# Patient Record
Sex: MALE | Race: WHITE | NOT HISPANIC OR LATINO | URBAN - METROPOLITAN AREA
[De-identification: names, ages, dates, MRNs, and addresses within clinical notes are randomized per-mention and may not be internally consistent; named-entity substitution may affect disease eponyms.]

---

## 2019-04-28 ENCOUNTER — ANCILLARY PROCEDURE (OUTPATIENT)
Dept: GENERAL RADIOLOGY | Facility: CLINIC | Age: 24
End: 2019-04-28
Attending: FAMILY MEDICINE
Payer: COMMERCIAL

## 2019-04-28 ENCOUNTER — OFFICE VISIT (OUTPATIENT)
Dept: ORTHOPEDICS | Facility: CLINIC | Age: 24
End: 2019-04-28
Payer: COMMERCIAL

## 2019-04-28 VITALS
BODY MASS INDEX: 20.67 KG/M2 | HEART RATE: 71 BPM | SYSTOLIC BLOOD PRESSURE: 116 MMHG | WEIGHT: 156 LBS | DIASTOLIC BLOOD PRESSURE: 75 MMHG | HEIGHT: 73 IN

## 2019-04-28 DIAGNOSIS — M25.572 ACUTE LEFT ANKLE PAIN: Primary | ICD-10-CM

## 2019-04-28 ASSESSMENT — MIFFLIN-ST. JEOR: SCORE: 1756.49

## 2019-04-28 ASSESSMENT — PAIN SCALES - GENERAL: PAINLEVEL: NO PAIN (1)

## 2019-04-28 NOTE — PROGRESS NOTES
NEW PATIENT INTAKE QUESTIONNAIRE  SPORTS & ORTHOPEDIC WALK-IN 4/28/2019    Primary Care Physician: none    Reason for Visit:    What part of your body is injured / painful?  left ankle    What caused the injury /pain? Recreational/competitive sports injury -  SPORTS:[id number]}    How long ago did your injury occur or pain begin? yesterday    What are your most bothersome symptoms? Pain    How would you characterize your symptom? aching    What makes your symptoms better? Rest    What makes your symptoms worse? Standing, Walking, Sitting and Movement    Have you been previously seen for this problem? No    Medical History:    Medical History: none    Have you had surgery on this body part before? No    Medications: none    Allergies: No known drug allergies    Family History of Medical Problems: none    Previous Surgeries: none    Social History:    Occupation: Student     Handedness: Right    Review of Systems:    Have you recently had a a fever, chills, weight loss? No    Do you have any vision problems? No    Do you have any chest pain or edema? No    Do you have any shortness of breath or wheezing?  No    Do you have stomach problems? No    Do you have any numbness or focal weakness? No    Do you have diabetes? No    Do you have problems with bleeding or clotting? No    Do you have an rashes or other skin lesions? No           HPI    Mr. Tyler is a pleasant 23 year old year old male who presents to orthopedic walk in clinic today with concerns of left ankle pain and swelling for 1 day.  Parker explains that he was playing ultimate Frisbee yesterday when 2 other participants landed on his ankle.  He is unsure if it inverted or everted.  After that time he had pain with ambulation and is developed swelling over the lateral aspect of the ankle.  No history of significant ankle pain or injuries.  No numbness or tingling.  He is having difficulty walking on the affected extremity.      Medical History     Denies  "significant medical or surgical history other than noted above.     No Known Allergies    Social History     Socioeconomic History     Marital status: Single     Spouse name: Not on file     Number of children: Not on file     Years of education: Not on file     Highest education level: Not on file   Occupational History     Not on file   Social Needs     Financial resource strain: Not on file     Food insecurity:     Worry: Not on file     Inability: Not on file     Transportation needs:     Medical: Not on file     Non-medical: Not on file   Tobacco Use     Smoking status: Never Smoker     Smokeless tobacco: Never Used   Substance and Sexual Activity     Alcohol use: Not on file     Drug use: Not on file     Sexual activity: Not on file   Lifestyle     Physical activity:     Days per week: Not on file     Minutes per session: Not on file     Stress: Not on file   Relationships     Social connections:     Talks on phone: Not on file     Gets together: Not on file     Attends Latter day service: Not on file     Active member of club or organization: Not on file     Attends meetings of clubs or organizations: Not on file     Relationship status: Not on file     Intimate partner violence:     Fear of current or ex partner: Not on file     Emotionally abused: Not on file     Physically abused: Not on file     Forced sexual activity: Not on file   Other Topics Concern     Not on file   Social History Narrative     Not on file       No family history on file.    Current Outpatient Medications   Medication     Acetaminophen 325 MG CAPS     No current facility-administered medications for this visit.            Objective Findings     Vitals:    04/28/19 0817   BP: 116/75   BP Location: Left arm   Patient Position: Sitting   Pulse: 71   Weight: 70.8 kg (156 lb)   Height: 1.854 m (6' 1\")         Physical Exam:  Gen: A&O in NAD   CV: extremities well perfused x4  Pulm: without respiratory distress  Derm: no rashes or " lesions  Psych: normal affect and speech  Gait: Sitting in a wheelchair    MSK:   -Left ankle: Notable swelling over the lateral malleolus.  Range of motion is slightly limited secondary to discomfort.  He is intact 5/5 distal ankle and foot strength.  He has tenderness palpation over the distal lateral malleolus.  He is exquisitely tender over the ATFL and less so tender over the CFL.  Nontender over the PTFL.  Mildly tender over the deltoid ligament.  He is nontender over the bony prominence of the foot including the navicular, base of fifth, and metatarsals.  Mild discomfort with syndesmotic squeeze and eversion stress.  He has intact distal sensation and brisk capillary refill.      Three-view x-ray of the left ankle was obtained today with the following findings:       Impression:  1. No acute osseous abnormality.  2. Soft tissue swelling overlying lateral malleolus.           Assessment / Plan     #) Left ankle pain and swelling    -Findings reviewed with patient as above  -Imaging reviewed and discussed as above  -X-rays are reassuring  -Discussed options for care including weightbearing as tolerated vs short course of immobilization in a cam boot and then subsequent progression vs short period of off loading with crutches  -Following joint decision-making, patient was interested in short period of off loading with crutches and ankle brace then progression off of crutches over next 3 days.  -If he does not have improvement over the subsequent week, recommend follow-up for evaluation at which time advanced imaging could be considered  -Discussed the occurrence of the injury could put him at increased risk of subsequent injuries in the future, recommend proprioceptive exercises and wearing a lace up ankle brace  -If prolonged course of improvement, he may benefit from formal physical therapy in the near future  -Recommend NSAIDs, activity modification, compression, and elevation in the interim    Patient  endorsed understanding and agreement with the above plan    Martin Martins MD  Primary Care Sports Medicine, [facility]

## 2019-04-28 NOTE — LETTER
4/28/2019       RE: Parker Tyler  [address]  [facility] [address]     Dear Colleague,    Thank you for referring your patient, Parker Tyler, to the [facility] SPORTS AND ORTHOPAEDIC WALK IN CLINIC at [facility]. Please see a copy of my visit note below.         NEW PATIENT INTAKE QUESTIONNAIRE  SPORTS & ORTHOPEDIC WALK-IN 4/28/2019    Primary Care Physician: none    Reason for Visit:    What part of your body is injured / painful?  left ankle    What caused the injury /pain? Recreational/competitive sports injury -  SPORTS:[id number]}    How long ago did your injury occur or pain begin? yesterday    What are your most bothersome symptoms? Pain    How would you characterize your symptom? aching    What makes your symptoms better? Rest    What makes your symptoms worse? Standing, Walking, Sitting and Movement    Have you been previously seen for this problem? No    Medical History:    Medical History: none    Have you had surgery on this body part before? No    Medications: none    Allergies: No known drug allergies    Family History of Medical Problems: none    Previous Surgeries: none    Social History:    Occupation: Student     Handedness: Right    Review of Systems:    Have you recently had a a fever, chills, weight loss? No    Do you have any vision problems? No    Do you have any chest pain or edema? No    Do you have any shortness of breath or wheezing?  No    Do you have stomach problems? No    Do you have any numbness or focal weakness? No    Do you have diabetes? No    Do you have problems with bleeding or clotting? No    Do you have an rashes or other skin lesions? No           HPI    Mr. Tyler is a pleasant 23 year old year old male who presents to orthopedic walk in clinic today with concerns of left ankle pain and swelling for 1 day.  Parker explains that he was playing ultimate Frisbee yesterday when 2 other participants landed on his ankle.   He is unsure if it inverted or everted.  After that time he had pain with ambulation and is developed swelling over the lateral aspect of the ankle.  No history of significant ankle pain or injuries.  No numbness or tingling.  He is having difficulty walking on the affected extremity.      Medical History     Denies significant medical or surgical history other than noted above.     No Known Allergies    Social History     Socioeconomic History     Marital status: Single     Spouse name: Not on file     Number of children: Not on file     Years of education: Not on file     Highest education level: Not on file   Occupational History     Not on file   Social Needs     Financial resource strain: Not on file     Food insecurity:     Worry: Not on file     Inability: Not on file     Transportation needs:     Medical: Not on file     Non-medical: Not on file   Tobacco Use     Smoking status: Never Smoker     Smokeless tobacco: Never Used   Substance and Sexual Activity     Alcohol use: Not on file     Drug use: Not on file     Sexual activity: Not on file   Lifestyle     Physical activity:     Days per week: Not on file     Minutes per session: Not on file     Stress: Not on file   Relationships     Social connections:     Talks on phone: Not on file     Gets together: Not on file     Attends Zoroastrianism service: Not on file     Active member of club or organization: Not on file     Attends meetings of clubs or organizations: Not on file     Relationship status: Not on file     Intimate partner violence:     Fear of current or ex partner: Not on file     Emotionally abused: Not on file     Physically abused: Not on file     Forced sexual activity: Not on file   Other Topics Concern     Not on file   Social History Narrative     Not on file       No family history on file.    Current Outpatient Medications   Medication     Acetaminophen 325 MG CAPS     No current facility-administered medications for this visit.   "          Objective Findings     Vitals:    04/28/19 0817   BP: 116/75   BP Location: Left arm   Patient Position: Sitting   Pulse: 71   Weight: 70.8 kg (156 lb)   Height: 1.854 m (6' 1\")         Physical Exam:  Gen: A&O in NAD   CV: extremities well perfused x4  Pulm: without respiratory distress  Derm: no rashes or lesions  Psych: normal affect and speech  Gait: Sitting in a wheelchair    MSK:   -Left ankle: Notable swelling over the lateral malleolus.  Range of motion is slightly limited secondary to discomfort.  He is intact 5/5 distal ankle and foot strength.  He has tenderness palpation over the distal lateral malleolus.  He is exquisitely tender over the ATFL and less so tender over the CFL.  Nontender over the PTFL.  Mildly tender over the deltoid ligament.  He is nontender over the bony prominence of the foot including the navicular, base of fifth, and metatarsals.  Mild discomfort with syndesmotic squeeze and eversion stress.  He has intact distal sensation and brisk capillary refill.      Three-view x-ray of the left ankle was obtained today with the following findings:       Impression:  1. No acute osseous abnormality.  2. Soft tissue swelling overlying lateral malleolus.           Assessment / Plan     #) Left ankle pain and swelling    -Findings reviewed with patient as above  -Imaging reviewed and discussed as above  -X-rays are reassuring  -Discussed options for care including weightbearing as tolerated vs short course of immobilization in a cam boot and then subsequent progression vs short period of off loading with crutches  -Following joint decision-making, patient was interested in short period of off loading with crutches and ankle brace then progression off of crutches over next 3 days.  -If he does not have improvement over the subsequent week, recommend follow-up for evaluation at which time advanced imaging could be considered  -Discussed the occurrence of the injury could put him at " increased risk of subsequent injuries in the future, recommend proprioceptive exercises and wearing a lace up ankle brace  -If prolonged course of improvement, he may benefit from formal physical therapy in the near future  -Recommend NSAIDs, activity modification, compression, and elevation in the interim    Patient endorsed understanding and agreement with the above plan    Martin Martins MD  Primary Care Sports Medicine, [facility]        Again, thank you for allowing me to participate in the care of your patient.      Sincerely,    Martin Martins MD

## 2024-09-13 ENCOUNTER — OFFICE VISIT (OUTPATIENT)
Dept: FAMILY MEDICINE | Facility: CLINIC | Age: 29
End: 2024-09-13
Payer: COMMERCIAL

## 2024-09-13 VITALS
WEIGHT: 169.6 LBS | HEART RATE: 52 BPM | HEIGHT: 73 IN | BODY MASS INDEX: 22.48 KG/M2 | RESPIRATION RATE: 16 BRPM | OXYGEN SATURATION: 97 % | TEMPERATURE: 97.5 F | SYSTOLIC BLOOD PRESSURE: 116 MMHG | DIASTOLIC BLOOD PRESSURE: 65 MMHG

## 2024-09-13 DIAGNOSIS — I86.1 LEFT VARICOCELE: ICD-10-CM

## 2024-09-13 DIAGNOSIS — L73.9 FOLLICULITIS: Primary | ICD-10-CM

## 2024-09-13 PROCEDURE — 90471 IMMUNIZATION ADMIN: CPT | Performed by: FAMILY MEDICINE

## 2024-09-13 PROCEDURE — 90656 IIV3 VACC NO PRSV 0.5 ML IM: CPT | Performed by: FAMILY MEDICINE

## 2024-09-13 PROCEDURE — 99203 OFFICE O/P NEW LOW 30 MIN: CPT | Mod: 25 | Performed by: FAMILY MEDICINE

## 2024-09-13 RX ORDER — CIPROFLOXACIN 500 MG/1
500 TABLET, FILM COATED ORAL 2 TIMES DAILY
Qty: 20 TABLET | Refills: 0 | Status: SHIPPED | OUTPATIENT
Start: 2024-09-13

## 2024-09-13 NOTE — PROGRESS NOTES
"  Assessment & Plan     Folliculitis  Left testicle. Went into hot tub at hotel prior to onset. Antibiotics sent. Rtc in 7-10 days if not improved.   - ciprofloxacin (CIPRO) 500 MG tablet  Dispense: 20 tablet; Refill: 0    Left varicocele  Previous [place] in [place] 3 years ago.                   Juvenal Canales is a 28 year old, presenting for the following health issues:  Testicular Problem (Bump on left testicle, tender to the touch, x 5 days )        9/13/2024     3:50 PM   Additional Questions   Roomed by Shilpi NAYLOR LPN     History of Present Illness       Reason for visit:  Something on scrotum\testicle  Symptom onset:  3-7 days ago   He is taking medications regularly.     Mildly painful.  Had frisbee tournament then noticed the bump and tenderness that is spreading up.   Sexually active with one long term partner.    Has hip adductor tendinopathy.            Review of Systems  Constitutional, HEENT, cardiovascular, pulmonary, gi and gu systems are negative, except as otherwise noted.      Objective    /65   Pulse 52   Temp 97.5  F (36.4  C) (Oral)   Resp 16   Ht 1.854 m (6' 1\")   Wt 76.9 kg (169 lb 9.6 oz)   SpO2 97%   BMI 22.38 kg/m    Body mass index is 22.38 kg/m .  Physical Exam   GENERAL: alert and no distress   (male): normal male genitalia inflamed follicular papules left testicle   MS: no gross musculoskeletal defects noted, no edema  NEURO: Normal strength and tone, mentation intact and speech normal  PSYCH: mentation appears normal, affect normal/bright            Signed Electronically by: Sydnee Mark MD    "